# Patient Record
Sex: MALE | Race: WHITE | Employment: UNEMPLOYED | URBAN - METROPOLITAN AREA
[De-identification: names, ages, dates, MRNs, and addresses within clinical notes are randomized per-mention and may not be internally consistent; named-entity substitution may affect disease eponyms.]

---

## 2020-01-20 ENCOUNTER — APPOINTMENT (OUTPATIENT)
Dept: GENERAL RADIOLOGY | Facility: CLINIC | Age: 75
End: 2020-01-20
Payer: MEDICARE

## 2020-01-20 ENCOUNTER — HOSPITAL ENCOUNTER (EMERGENCY)
Facility: CLINIC | Age: 75
Discharge: HOME | End: 2020-01-20
Attending: SPECIALIST
Payer: MEDICARE

## 2020-01-20 VITALS
DIASTOLIC BLOOD PRESSURE: 85 MMHG | OXYGEN SATURATION: 100 % | BODY MASS INDEX: 22.4 KG/M2 | RESPIRATION RATE: 20 BRPM | HEART RATE: 67 BPM | HEIGHT: 71 IN | WEIGHT: 160 LBS | SYSTOLIC BLOOD PRESSURE: 187 MMHG | TEMPERATURE: 97.8 F

## 2020-01-20 PROCEDURE — 99284 EMERGENCY DEPT VISIT MOD MDM: CPT

## 2020-01-20 PROCEDURE — 73502 X-RAY EXAM HIP UNI 2-3 VIEWS: CPT

## 2020-01-20 RX ORDER — HYDROCODONE BITARTRATE AND ACETAMINOPHEN 5; 325 MG/1; MG/1
1 TABLET ORAL EVERY 6 HOURS PRN
COMMUNITY

## 2020-01-20 ASSESSMENT — PAIN DESCRIPTION - FREQUENCY: FREQUENCY: CONTINUOUS

## 2020-01-20 ASSESSMENT — ENCOUNTER SYMPTOMS
COUGH: 0
WHEEZING: 0
ABDOMINAL PAIN: 0
SHORTNESS OF BREATH: 0
COLOR CHANGE: 0
NAUSEA: 0

## 2020-01-20 ASSESSMENT — PAIN DESCRIPTION - DESCRIPTORS: DESCRIPTORS: THROBBING;TINGLING

## 2020-01-20 ASSESSMENT — PAIN DESCRIPTION - LOCATION: LOCATION: GROIN;HIP

## 2020-01-20 ASSESSMENT — PAIN SCALES - GENERAL: PAINLEVEL_OUTOF10: 8

## 2020-01-20 ASSESSMENT — PAIN DESCRIPTION - ORIENTATION: ORIENTATION: LEFT

## 2020-01-20 ASSESSMENT — PAIN DESCRIPTION - PAIN TYPE: TYPE: ACUTE PAIN

## 2020-01-20 NOTE — ED PROVIDER NOTES
[facility]  [facility]  Phone: [phone]      Pt Name: Gil Motley  MRN: [id number]  [place] 1945  Date of evaluation: 1/20/2020      CHIEF COMPLAINT       Chief Complaint   Patient presents with    Hip Pain     since Friday evening after \"turning wrong carrying firewood\", left hip, groin area and into left thigh. gait steady. HISTORY OF PRESENT ILLNESS    Gil Motley is a 66 y.o. male who presents   Chief Complaint   Patient presents with    Hip Pain     since Friday evening after \"turning wrong carrying firewood\", left hip, groin area and into left thigh. gait steady. .    49-year-old male patient presents to the emergency department accompanied by his family for evaluation of left hip pain radiating to the left groin with some tingling and numbness on anterior aspect of the left mid thigh since last 3 days. Patient states he was turning wrong carrying the firewood 3 days ago at the onset of the pain. Pain was 9 out of 10 in intensity initially and has decreased down to 3 out of 10 in intensity since patient has been taking ibuprofen and Norco.  He takes Norco for right elbow and left shoulder pain. He denies any bladder or bowel incontinence and denies any urinary frequency, urgency, dysuria or hematuria. No history of fever or chills. Patient also denies any abdominal pain, lightheadedness or dizziness and denies any testicular pain or swelling. Pain is worse with the movements, weightbearing and ambulation and better with rest as well as ibuprofen and Norco.    REVIEW OF SYSTEMS       Review of Systems   Constitutional: Negative for chills, diaphoresis and fever. Respiratory: Negative for cough, shortness of breath and wheezing. Cardiovascular: Negative for chest pain, palpitations and leg swelling. Gastrointestinal: Negative for abdominal pain and nausea. Musculoskeletal: Positive for arthralgias, gait problem and myalgias. Neurological:      Mental Status: He is alert and oriented to person, place, and time. DIFFERENTIAL DIAGNOSIS/ MDM:     Left groin strain, sciatica, superficial femoral nerve entrapment, nondisplaced fracture although doubtful    DIAGNOSTIC RESULTS     EKG: All EKG's are interpreted by the Emergency Department Physician who either signs or Co-signs this chart in the absence of a cardiologist.    None obtained    RADIOLOGY:   I directly visualized the following  images and reviewed the radiologist interpretations:  XR HIP 2-3 VW W PELVIS LEFT   Final Result   1. Mild bilateral hip osteoarthrosis. 2. No acute fracture or dislocation. Xr Hip 2-3 Vw W Pelvis Left    Result Date: 1/20/2020  EXAMINATION: ONE XRAY VIEW OF THE PELVIS AND TWO XRAY VIEWS LEFT HIP 1/20/2020 10:30 am COMPARISON: None. HISTORY: ORDERING SYSTEM PROVIDED HISTORY: Hip and groin pain TECHNOLOGIST PROVIDED HISTORY: Hip and groin pain Reason for Exam: Pt c/o LEFT hip and pelvic pain x3 days after turning wrong carrying firewood. Acuity: Acute Type of Exam: Initial 22-year-old male with left hip and groin pain FINDINGS: Mild osteophyte spurring and joint space narrowing involving the bilateral hip joint spaces. Diffuse osteopenia. Both femoral heads project over the bilateral acetabula without clear evidence for acute fracture, dislocation or femoral head flattening. Moderate stool burden. Mild degenerative changes in the lower lumbar spine. Pelvic phleboliths. 1. Mild bilateral hip osteoarthrosis. 2. No acute fracture or dislocation.        LABS:  Labs Reviewed - No data to display      EMERGENCY DEPARTMENT COURSE:   Vitals:    Vitals:    01/20/20 1016   BP: (!) 187/85   Pulse: 67   Resp: 20   Temp: 97.8 °F (36.6 °C)   TempSrc: Oral   SpO2: 100%   Weight: 72.6 kg (160 lb)   Height: 5' 11\" (1.803 m)     -------------------------  BP: (!) 187/85, Temp: 97.8 °F (36.6 °C), Pulse: 67, Resp: 20    No orders of the defined types were placed in this encounter. During the emergency department course, patient is resting comfortably and does not appear to be in any pain or distress. He recently filled a prescription for [place] prescribed from [facility].  He is advised to continue Norco and ibuprofen as needed, plenty of oral fluids, follow-up with PCP, return if worse    I have reviewed the disposition diagnosis with the patient and or their family/guardian. I have answered their questions and given discharge instructions. They voiced understanding of these instructions and did not have any further questions or complaints. Re-evaluation Notes    Patient is ambulatory in the hallway without any discomfort. He remained hemodynamically stable and neurologically intact throughout the ED course. PROCEDURES:  None    FINAL IMPRESSION      1. Strain of left hip, initial encounter    2. Groin strain, left, initial encounter          DISPOSITION/PLAN   DISPOSITION Decision To Discharge 01/20/2020 11:13:01 AM      Condition on Disposition    Stable    PATIENT REFERRED TO:  Your Physician at [facility]    Call in 1 day  For reevaluation of current symptoms    [facility] ED  C/ [address]   [phone]    If symptoms worsen      DISCHARGE MEDICATIONS:  Discharge Medication List as of 1/20/2020 11:14 AM          (Please note that portions of this note were completed with a voice recognition program.  Efforts were made to edit the dictations but occasionally words are mis-transcribed.)    Headley MD, F.A.C.E.P.   Attending Emergency Physician      Zoe Diaz MD  01/20/20 3679

## 2025-01-15 RX ORDER — SODIUM CHLORIDE, SODIUM LACTATE, POTASSIUM CHLORIDE, CALCIUM CHLORIDE 600; 310; 30; 20 MG/100ML; MG/100ML; MG/100ML; MG/100ML
INJECTION, SOLUTION INTRAVENOUS CONTINUOUS
OUTPATIENT
Start: 2025-01-15

## 2025-01-15 NOTE — DISCHARGE INSTRUCTIONS
Pre-operative Instructions           NOTHING to eat or drink after midnight the night prior to surgery   (This includes gum, candy, mints, chewing tobacco, etc). Smoking cessation is always advised.     Please arrive at the surgery center (Entrance B) by 9:15 AM on 2/5/2025 (or as directed by your surgeon's office).  See Directons to Surgery Center on next page.     Please take only the following medication(s) the day of surgery with a small sip of water: if needed/available: Norco    If applicable:   -Use/bring daily inhalers with you   -Do not take diabetic medications on the day of surgery.    Please stop any blood thinning medications  AS DIRECTED BY PRESCRIBING PROVIDER!     Failure to stop these medications as instructed (too soon or too late) may result in injury to you, or your surgery may need to be rescheduled.   Below is a list of some examples for your reference. This is not an all-inclusive list and if you have any questions/concerns, please check with your surgeon's office.     Antiplatelets : (stop blood cells (called platelets) from sticking together and forming a blood clot):   Aspirin (Bufferin, Ecotrin), Clopidogrel (Plavix), Ticagrelor (Brilinta), Prasugrel (Effient), Dipyridamole/aspirin (Aggrenox), Ticlopidine (Ticlid), Eptifibatide (Integrilin)    Anticoagulants: (slow down your body's process of making clots):   Warfarin (Coumadin), Rivaroxaban (Xarelto), Dabigatran (Pradaxa), Apixaban (Eliquis), Edoxaban (Savaysa), Heparin, Enoxaparin (Lovenox), Fondaparinux (Arixtra)    NSAIDS: Aspirin (Bufferin, Ecotrin), Ibuprofen (Motrin, Nuprin,Advil), Naproxen (Aleve),Meloxicam (Mobic), Celecoxib (Celebrex), Diclofenac (Voltaren), Etodolac (Lodine), Indomethacin, Ketorolac, Nabumetone, Oxaprozin (Daypro), Piroxicam (Feldene), Excedrin (has aspirin in it)    Herbals/supplements: Bromelain, Cinnamon, Cayenne Pepper, CoQ10, Dong quai (female ginseng), Fish oil, Garlic, Anita, Ginseng, Ginkgo biloba,

## 2025-01-21 ENCOUNTER — HOSPITAL ENCOUNTER (OUTPATIENT)
Dept: PREADMISSION TESTING | Age: 80
Discharge: HOME | End: 2025-01-25
Payer: MEDICARE

## 2025-01-21 VITALS
BODY MASS INDEX: 20.3 KG/M2 | TEMPERATURE: 97.9 F | DIASTOLIC BLOOD PRESSURE: 80 MMHG | WEIGHT: 145 LBS | HEART RATE: 66 BPM | SYSTOLIC BLOOD PRESSURE: 140 MMHG | HEIGHT: 71 IN | RESPIRATION RATE: 20 BRPM | OXYGEN SATURATION: 100 %

## 2025-01-21 LAB
ANION GAP SERPL CALCULATED.3IONS-SCNC: 10 MMOL/L (ref 9–16)
BUN SERPL-MCNC: 19 MG/DL (ref 8–23)
CALCIUM SERPL-MCNC: 9.4 MG/DL (ref 8.6–10.4)
CHLORIDE SERPL-SCNC: 104 MMOL/L (ref 98–107)
CO2 SERPL-SCNC: 26 MMOL/L (ref 20–31)
CREAT SERPL-MCNC: 0.8 MG/DL (ref 0.7–1.2)
ERYTHROCYTE [DISTWIDTH] IN BLOOD BY AUTOMATED COUNT: 12.8 % (ref 11.8–14.4)
GFR, ESTIMATED: 89 ML/MIN/1.73M2
GLUCOSE SERPL-MCNC: 103 MG/DL (ref 74–99)
HCT VFR BLD AUTO: 37.6 % (ref 40.7–50.3)
HGB BLD-MCNC: 12.6 G/DL (ref 13–17)
MCH RBC QN AUTO: 30.4 PG (ref 25.2–33.5)
MCHC RBC AUTO-ENTMCNC: 33.5 G/DL (ref 28.4–34.8)
MCV RBC AUTO: 90.6 FL (ref 82.6–102.9)
NRBC BLD-RTO: 0 PER 100 WBC
PLATELET # BLD AUTO: ABNORMAL K/UL (ref 138–453)
PLATELET, FLUORESCENCE: 159 K/UL (ref 138–453)
PLATELETS.RETICULATED NFR BLD AUTO: 2 % (ref 1.1–10.3)
POTASSIUM SERPL-SCNC: 4.1 MMOL/L (ref 3.7–5.3)
RBC # BLD AUTO: 4.15 M/UL (ref 4.21–5.77)
SODIUM SERPL-SCNC: 140 MMOL/L (ref 136–145)
WBC OTHER # BLD: 4.9 K/UL (ref 3.5–11.3)

## 2025-01-21 PROCEDURE — 80048 BASIC METABOLIC PNL TOTAL CA: CPT

## 2025-01-21 PROCEDURE — 36415 COLL VENOUS BLD VENIPUNCTURE: CPT

## 2025-01-21 PROCEDURE — 85027 COMPLETE CBC AUTOMATED: CPT

## 2025-01-21 PROCEDURE — 93005 ELECTROCARDIOGRAM TRACING: CPT | Performed by: ANESTHESIOLOGY

## 2025-01-21 PROCEDURE — 85055 RETICULATED PLATELET ASSAY: CPT

## 2025-01-21 RX ORDER — MULTIVITAMIN WITH IRON
1 TABLET ORAL DAILY
COMMUNITY

## 2025-01-21 RX ORDER — TADALAFIL 5 MG/1
5 TABLET ORAL DAILY
COMMUNITY

## 2025-01-21 NOTE — PROGRESS NOTES
Anesthesia Focused Assessment  Upcoming surgery:   2/5/2025 CYSTOSCOPY GREENLIGHT ([facility]-CONF#[id number]- [facility]) Augustine Nunez MD     History of anesthesia complications:  no  Family history of anesthesia complications:  no    METS functional capacity:   -Moderate/Excellent >4     + Covid-19 test in last 8 weeks? no    STOP-BANG Sleep Apnea Questionnaire  SNORE loudly (heard through closed doors)?   No  TIRED, fatigued, sleepy during daytime?    No  OBSERVED stopping breathing during sleep?   No  High blood PRESSURE or being treated?    No    BMI over 35?        No  AGE over 50?        Yes  NECK circumference over 16\"?     No  GENDER (male)?       Yes             Total 2  High risk 5-8  Intermediate risk 3-4  Low risk 0-2    ----------------------------------------------------------------------------------------------------------------------  TARA:                                             no  If yes, machine?:                              Type 1 DM:                                   no  T2DM:                                           no    Coronary Artery Disease:             no  Hypertension:                               as listed [facility], pt denies, states he has white coat syndrome- states was Rx'd meds per PCP but never started- states checks BPs at home and they are normal range, states his BP this a.m at home was 118/60. No cardiopulmonary complaints.   Defib / AICD / Pacemaker:           no    Renal Failure:                               (CKD listed in [facility] progress note, but pt denies)  If yes, on dialysis?:                           Active smoker:                              no  Drinks Alcohol:                              no  Illicit drugs:                                    no    Dentition:                                      dentures, full upper, partial lower    Past Medical History:   Diagnosis Date    Arthritis     back    Arthritis     shoulder    BPH (benign

## 2025-01-22 LAB
EKG ATRIAL RATE: 63 BPM
EKG P AXIS: 26 DEGREES
EKG P-R INTERVAL: 138 MS
EKG Q-T INTERVAL: 424 MS
EKG QRS DURATION: 96 MS
EKG QTC CALCULATION (BAZETT): 433 MS
EKG R AXIS: 70 DEGREES
EKG T AXIS: 77 DEGREES
EKG VENTRICULAR RATE: 63 BPM

## 2025-01-22 PROCEDURE — 93010 ELECTROCARDIOGRAM REPORT: CPT | Performed by: INTERNAL MEDICINE

## 2025-02-04 NOTE — H&P
Pre-op History and Physical      Patient:  Jairo Aly  MRN: [id number]  YOB: 1945    HISTORY OF PRESENT ILLNESS:     The patient is a 79 y.o. male who presents BPH with LUTS. Here for cystoscopy, greenlight PVP.    Patient's old records, notes and chart reviewed and summarized above.     I independently reviewed the images and verified the radiology reports from:    No results found.      Past Medical History:    Past Medical History:   Diagnosis Date    Arthritis     back    Arthritis     shoulder    BPH (benign prostatic hyperplasia)     Chronic lumbar pain     Hearing loss     History of hypertension     as listed in [facility] diagnosis list, pt denies, states he has white coat syndrome- states was Rx'd meds per PCP but never started- states checks BPs at home and they are normal range, states his BP this a.m at home was 118/60    Lumbar degenerative disc disease     herniated disc    Wears dentures     full upper, partial lower    Wellness examination     Dr. REMY Ren, PCP       Past Surgical History:    Past Surgical History:   Procedure Laterality Date    ARM SURGERY Right     elbow and lower arm    COLONOSCOPY      about 20 years ago    EYE SURGERY Bilateral     cataract removal w/IOL    TONSILLECTOMY         Medications Prior to Admission:    Prior to Admission medications    Medication Sig Start Date End Date Taking? Authorizing Provider   tadalafil (CIALIS) 5 MG tablet Take 1 tablet by mouth daily    ProviderRubina MD   Multiple Vitamin (MULTIVITAMIN) TABS tablet Take 1 tablet by mouth daily    Rubina Winslow MD   HYDROcodone-acetaminophen (NORCO) 5-325 MG per tablet Take 1 tablet by mouth every 6 hours as needed for Pain.    ProviderRubina MD       Allergies:  Patient has no known allergies.    Social History:    Social History     Socioeconomic History    Marital status:      Spouse name: Not on file    Number of children: Not on file    Years of

## 2025-02-04 NOTE — DISCHARGE INSTRUCTIONS
No alcoholic beverages, no driving or operating machinery, no making important decisions for 24 hours.   You may have a normal diet but should eat lightly day of surgery.  Drink plenty of fluids.  Urinate within 8 hours after surgery, if unable to urinate call your doctorDischarge instructions: Greenlight Photovaporization of the prostate:   The patient should have CBI weaned off in recovery.  Please call if urine is not clear / pink with CBI.  Traction on the catheter should be released before discharge  Contue flomax for 1 month (if taking)    You may see blood in the urine after the procedure.  This should resolve over the next couple days.  Please stay hydrated.  You may experience frequency/urgency of urination after the procedure.  We expect these symptoms to improve over the next couple weeks.      Tylenol for pain control  Patient ok to discharge home in good condition  No heavy lifting, >10 lbs for today  Patient should avoid strenuous activity for today  Patient should walk moderately at home  Patient ok to shower   Patient may resume diet as tolerated  Patient should take Rx as directed  No driving while on narcotics  Please call attending physician or hospital  with questions  Call or Present to ED if fever (> 101F), intractable nausea vomiting or pain.    Pt should follow up with Dr. Nunez's office tomorrow to have catheter removed, call to confirm appointment    Home with ardon catheter.  Please teach ardon education and send home with leg and night bag.  You may see intermittent blood in the urine while the catheter in place.  If the catheter becomes obstructed and needs to be exchanged, please call.

## 2025-02-05 ENCOUNTER — ANESTHESIA EVENT (OUTPATIENT)
Dept: OPERATING ROOM | Age: 80
End: 2025-02-05
Payer: MEDICARE

## 2025-02-05 ENCOUNTER — HOSPITAL ENCOUNTER (OUTPATIENT)
Age: 80
Setting detail: OUTPATIENT SURGERY
Discharge: HOME | End: 2025-02-05
Attending: UROLOGY | Admitting: UROLOGY
Payer: MEDICARE

## 2025-02-05 ENCOUNTER — ANESTHESIA (OUTPATIENT)
Dept: OPERATING ROOM | Age: 80
End: 2025-02-05
Payer: MEDICARE

## 2025-02-05 VITALS
BODY MASS INDEX: 20.22 KG/M2 | TEMPERATURE: 97.2 F | HEART RATE: 58 BPM | OXYGEN SATURATION: 100 % | WEIGHT: 145 LBS | SYSTOLIC BLOOD PRESSURE: 172 MMHG | RESPIRATION RATE: 15 BRPM | DIASTOLIC BLOOD PRESSURE: 72 MMHG

## 2025-02-05 PROCEDURE — 2580000003 HC RX 258: Performed by: NURSE ANESTHETIST, CERTIFIED REGISTERED

## 2025-02-05 PROCEDURE — 3700000000 HC ANESTHESIA ATTENDED CARE: Performed by: UROLOGY

## 2025-02-05 PROCEDURE — 6370000000 HC RX 637 (ALT 250 FOR IP): Performed by: ANESTHESIOLOGY

## 2025-02-05 PROCEDURE — 3600000014 HC SURGERY LEVEL 4 ADDTL 15MIN: Performed by: UROLOGY

## 2025-02-05 PROCEDURE — 6360000002 HC RX W HCPCS: Performed by: NURSE ANESTHETIST, CERTIFIED REGISTERED

## 2025-02-05 PROCEDURE — 2500000003 HC RX 250 WO HCPCS: Performed by: UROLOGY

## 2025-02-05 PROCEDURE — 3700000001 HC ADD 15 MINUTES (ANESTHESIA): Performed by: UROLOGY

## 2025-02-05 PROCEDURE — 7100000001 HC PACU RECOVERY - ADDTL 15 MIN: Performed by: UROLOGY

## 2025-02-05 PROCEDURE — 7100000010 HC PHASE II RECOVERY - FIRST 15 MIN: Performed by: UROLOGY

## 2025-02-05 PROCEDURE — 3600000004 HC SURGERY LEVEL 4 BASE: Performed by: UROLOGY

## 2025-02-05 PROCEDURE — 7100000011 HC PHASE II RECOVERY - ADDTL 15 MIN: Performed by: UROLOGY

## 2025-02-05 PROCEDURE — 2720000010 HC SURG SUPPLY STERILE: Performed by: UROLOGY

## 2025-02-05 PROCEDURE — 2709999900 HC NON-CHARGEABLE SUPPLY: Performed by: UROLOGY

## 2025-02-05 PROCEDURE — 2500000003 HC RX 250 WO HCPCS: Performed by: NURSE ANESTHETIST, CERTIFIED REGISTERED

## 2025-02-05 PROCEDURE — 6360000002 HC RX W HCPCS: Performed by: PHYSICIAN ASSISTANT

## 2025-02-05 PROCEDURE — 7100000000 HC PACU RECOVERY - FIRST 15 MIN: Performed by: UROLOGY

## 2025-02-05 RX ORDER — METOCLOPRAMIDE HYDROCHLORIDE 5 MG/ML
10 INJECTION INTRAMUSCULAR; INTRAVENOUS
Status: DISCONTINUED | OUTPATIENT
Start: 2025-02-05 | End: 2025-02-05 | Stop reason: HOSPADM

## 2025-02-05 RX ORDER — SODIUM CHLORIDE 9 MG/ML
INJECTION, SOLUTION INTRAVENOUS PRN
Status: DISCONTINUED | OUTPATIENT
Start: 2025-02-05 | End: 2025-02-05 | Stop reason: HOSPADM

## 2025-02-05 RX ORDER — SODIUM CHLORIDE, SODIUM LACTATE, POTASSIUM CHLORIDE, CALCIUM CHLORIDE 600; 310; 30; 20 MG/100ML; MG/100ML; MG/100ML; MG/100ML
INJECTION, SOLUTION INTRAVENOUS CONTINUOUS
Status: DISCONTINUED | OUTPATIENT
Start: 2025-02-05 | End: 2025-02-05 | Stop reason: HOSPADM

## 2025-02-05 RX ORDER — HYDRALAZINE HYDROCHLORIDE 20 MG/ML
10 INJECTION INTRAMUSCULAR; INTRAVENOUS
Status: DISCONTINUED | OUTPATIENT
Start: 2025-02-05 | End: 2025-02-05 | Stop reason: HOSPADM

## 2025-02-05 RX ORDER — NALOXONE HYDROCHLORIDE 0.4 MG/ML
INJECTION, SOLUTION INTRAMUSCULAR; INTRAVENOUS; SUBCUTANEOUS PRN
Status: DISCONTINUED | OUTPATIENT
Start: 2025-02-05 | End: 2025-02-05 | Stop reason: HOSPADM

## 2025-02-05 RX ORDER — FENTANYL CITRATE 50 UG/ML
INJECTION, SOLUTION INTRAMUSCULAR; INTRAVENOUS
Status: DISCONTINUED | OUTPATIENT
Start: 2025-02-05 | End: 2025-02-05 | Stop reason: SDUPTHER

## 2025-02-05 RX ORDER — SODIUM CHLORIDE, SODIUM LACTATE, POTASSIUM CHLORIDE, CALCIUM CHLORIDE 600; 310; 30; 20 MG/100ML; MG/100ML; MG/100ML; MG/100ML
INJECTION, SOLUTION INTRAVENOUS
Status: DISCONTINUED | OUTPATIENT
Start: 2025-02-05 | End: 2025-02-05 | Stop reason: SDUPTHER

## 2025-02-05 RX ORDER — EPHEDRINE SULFATE/0.9% NACL/PF 25 MG/5 ML
SYRINGE (ML) INTRAVENOUS
Status: DISCONTINUED | OUTPATIENT
Start: 2025-02-05 | End: 2025-02-05 | Stop reason: SDUPTHER

## 2025-02-05 RX ORDER — DIPHENHYDRAMINE HYDROCHLORIDE 50 MG/ML
12.5 INJECTION INTRAMUSCULAR; INTRAVENOUS
Status: DISCONTINUED | OUTPATIENT
Start: 2025-02-05 | End: 2025-02-05 | Stop reason: HOSPADM

## 2025-02-05 RX ORDER — MEPERIDINE HYDROCHLORIDE 50 MG/ML
12.5 INJECTION INTRAMUSCULAR; INTRAVENOUS; SUBCUTANEOUS EVERY 5 MIN PRN
Status: DISCONTINUED | OUTPATIENT
Start: 2025-02-05 | End: 2025-02-05 | Stop reason: HOSPADM

## 2025-02-05 RX ORDER — DEXAMETHASONE SODIUM PHOSPHATE 10 MG/ML
INJECTION INTRAMUSCULAR; INTRAVENOUS
Status: DISCONTINUED | OUTPATIENT
Start: 2025-02-05 | End: 2025-02-05 | Stop reason: SDUPTHER

## 2025-02-05 RX ORDER — ONDANSETRON 2 MG/ML
INJECTION INTRAMUSCULAR; INTRAVENOUS
Status: DISCONTINUED | OUTPATIENT
Start: 2025-02-05 | End: 2025-02-05 | Stop reason: SDUPTHER

## 2025-02-05 RX ORDER — PHENAZOPYRIDINE HYDROCHLORIDE 200 MG/1
200 TABLET, FILM COATED ORAL 3 TIMES DAILY PRN
Qty: 9 TABLET | Refills: 0 | Status: SHIPPED | OUTPATIENT
Start: 2025-02-05

## 2025-02-05 RX ORDER — DROPERIDOL 2.5 MG/ML
0.62 INJECTION, SOLUTION INTRAMUSCULAR; INTRAVENOUS
Status: DISCONTINUED | OUTPATIENT
Start: 2025-02-05 | End: 2025-02-05 | Stop reason: HOSPADM

## 2025-02-05 RX ORDER — HYDROCODONE BITARTRATE AND ACETAMINOPHEN 5; 325 MG/1; MG/1
1 TABLET ORAL ONCE
Status: COMPLETED | OUTPATIENT
Start: 2025-02-05 | End: 2025-02-05

## 2025-02-05 RX ORDER — PROPOFOL 10 MG/ML
INJECTION, EMULSION INTRAVENOUS
Status: DISCONTINUED | OUTPATIENT
Start: 2025-02-05 | End: 2025-02-05 | Stop reason: SDUPTHER

## 2025-02-05 RX ORDER — SODIUM CHLORIDE 0.9 % (FLUSH) 0.9 %
5-40 SYRINGE (ML) INJECTION EVERY 12 HOURS SCHEDULED
Status: DISCONTINUED | OUTPATIENT
Start: 2025-02-05 | End: 2025-02-05 | Stop reason: HOSPADM

## 2025-02-05 RX ORDER — LIDOCAINE HYDROCHLORIDE 10 MG/ML
INJECTION, SOLUTION EPIDURAL; INFILTRATION; INTRACAUDAL; PERINEURAL
Status: DISCONTINUED | OUTPATIENT
Start: 2025-02-05 | End: 2025-02-05 | Stop reason: SDUPTHER

## 2025-02-05 RX ORDER — MAGNESIUM HYDROXIDE 1200 MG/15ML
LIQUID ORAL CONTINUOUS PRN
Status: DISCONTINUED | OUTPATIENT
Start: 2025-02-05 | End: 2025-02-05 | Stop reason: HOSPADM

## 2025-02-05 RX ORDER — SODIUM CHLORIDE 0.9 % (FLUSH) 0.9 %
5-40 SYRINGE (ML) INJECTION PRN
Status: DISCONTINUED | OUTPATIENT
Start: 2025-02-05 | End: 2025-02-05 | Stop reason: HOSPADM

## 2025-02-05 RX ADMIN — FENTANYL CITRATE 25 MCG: 50 INJECTION, SOLUTION INTRAMUSCULAR; INTRAVENOUS at 12:21

## 2025-02-05 RX ADMIN — PROPOFOL 100 MG: 10 INJECTION, EMULSION INTRAVENOUS at 11:50

## 2025-02-05 RX ADMIN — EPHEDRINE SULFATE 5 MG: 5 INJECTION INTRAVENOUS at 11:58

## 2025-02-05 RX ADMIN — PROPOFOL 50 MG: 10 INJECTION, EMULSION INTRAVENOUS at 11:51

## 2025-02-05 RX ADMIN — EPHEDRINE SULFATE 10 MG: 5 INJECTION INTRAVENOUS at 12:05

## 2025-02-05 RX ADMIN — DEXAMETHASONE SODIUM PHOSPHATE 4 MG: 10 INJECTION INTRAMUSCULAR; INTRAVENOUS at 12:00

## 2025-02-05 RX ADMIN — Medication 2000 MG: at 11:57

## 2025-02-05 RX ADMIN — FENTANYL CITRATE 25 MCG: 50 INJECTION, SOLUTION INTRAMUSCULAR; INTRAVENOUS at 11:59

## 2025-02-05 RX ADMIN — HYDROCODONE BITARTRATE AND ACETAMINOPHEN 1 TABLET: 5; 325 TABLET ORAL at 14:28

## 2025-02-05 RX ADMIN — PROPOFOL 50 MG: 10 INJECTION, EMULSION INTRAVENOUS at 11:52

## 2025-02-05 RX ADMIN — ONDANSETRON 4 MG: 2 INJECTION INTRAMUSCULAR; INTRAVENOUS at 12:28

## 2025-02-05 RX ADMIN — LIDOCAINE HYDROCHLORIDE 50 MG: 10 INJECTION, SOLUTION EPIDURAL; INFILTRATION; INTRACAUDAL; PERINEURAL at 11:50

## 2025-02-05 RX ADMIN — SODIUM CHLORIDE, POTASSIUM CHLORIDE, SODIUM LACTATE AND CALCIUM CHLORIDE: 600; 310; 30; 20 INJECTION, SOLUTION INTRAVENOUS at 11:30

## 2025-02-05 ASSESSMENT — PAIN DESCRIPTION - LOCATION: LOCATION: PENIS

## 2025-02-05 ASSESSMENT — PAIN SCALES - GENERAL
PAINLEVEL_OUTOF10: 4
PAINLEVEL_OUTOF10: 2
PAINLEVEL_OUTOF10: 6
PAINLEVEL_OUTOF10: 5

## 2025-02-05 ASSESSMENT — PAIN - FUNCTIONAL ASSESSMENT: PAIN_FUNCTIONAL_ASSESSMENT: 0-10

## 2025-02-05 ASSESSMENT — PAIN DESCRIPTION - DESCRIPTORS: DESCRIPTORS: ACHING

## 2025-02-05 ASSESSMENT — PAIN DESCRIPTION - PAIN TYPE: TYPE: SURGICAL PAIN

## 2025-02-05 NOTE — ANESTHESIA POSTPROCEDURE EVALUATION
Department of Anesthesiology  Postprocedure Note    Patient: Jairo Aly  MRN: [id number]  YOB: 1945  Date of evaluation: 2/5/2025    Procedure Summary       Date: 02/05/25 Room / Location: [address]    Anesthesia Start: 1147 Anesthesia Stop: 1247    Procedure: CYSTOSCOPY GREENLIGHT ([facility] CONF#[id number]- [facility]) Diagnosis:       BPH with obstruction/lower urinary tract symptoms      (BPH with obstruction/lower urinary tract symptoms [N40.1, N13.8])    Surgeons: Augustine Nunez MD Responsible Provider: Saul Markham MD    Anesthesia Type: general ASA Status: 2            Anesthesia Type: No value filed.    Joce Phase I: Joce Score: 10    Joce Phase II: Joce Score: 10    Anesthesia Post Evaluation    Patient location during evaluation: bedside  Patient participation: complete - patient participated  Level of consciousness: awake and alert  Airway patency: patent  Nausea & Vomiting: no nausea and no vomiting  Cardiovascular status: hemodynamically stable  Respiratory status: acceptable  Hydration status: stable  Comments: BP (!) 172/72   Pulse 58   Temp 97.2 °F (36.2 °C) (Temporal)   Resp 15   Wt 65.8 kg (145 lb)   SpO2 100%   BMI 20.22 kg/m²     Pain management: adequate    No notable events documented.

## 2025-02-05 NOTE — OP NOTE
Operative Note      Patient: Jairo Aly  YOB: 1945  MRN: [id number]    Date of Procedure: 2/5/2025    Pre-Op Diagnosis Codes:      * BPH with obstruction/lower urinary tract symptoms [N40.1, N13.8]    Post-Op Diagnosis: Same       Procedure(s):  CYSTOSCOPY GREENLIGHT ([facility] CONF#[id number]- [facility])    Surgeon(s):  Augustine Nunez MD    Assistant:   Resident: Mil Flores MD; Jamil Pike MD    Anesthesia: General    Estimated Blood Loss (mL): Minimal    Complications: None    Specimens:   * No specimens in log *    Implants:  * No implants in log *      Drains:   Urinary Catheter 02/05/25 3 Way (Active)   Catheter Indications Perioperative use for selected surgical procedures 02/05/25 1500   Site Assessment No urethral drainage 02/05/25 1500   Urine Color Pink 02/05/25 1500   Urine Appearance Clear 02/05/25 1500   Collection Container Standard 02/05/25 1500   Securement Method Leg strap 02/05/25 1500   Catheter Best Practices  Drainage tube clipped to bed;Catheter secured to thigh;Bag below bladder;Bag not on floor;Lack of dependent loop in tubing 02/05/25 1500   Status Draining;Patent 02/05/25 1500   Rate Other (Comment) 02/05/25 1445   Irrigant Normal saline 02/05/25 1405   CBI Irrigation Intake (mL) 600 mL 02/05/25 1430   CBI Gomez Output (mL) 650 mL 02/05/25 1430   CBI Net Output (mL) 50 mL 02/05/25 1430   Output (mL) 900 mL 02/05/25 1545   Discontinuation Reason Per provider order 02/05/25 1445       Findings:  Infection Present At Time Of Surgery (PATOS) (choose all levels that have infection present):  No infection present  Other Findings: Bilobar prostatic hypertrophy     INDICATIONS:  Patient is a 79 year old male with BPH with obstruction. He presents today for GreenLight photovaporization of the prostate. After risks, benefits and alternatives of the procedure were discussed with the patient, informed consent was obtained and the patient elected to proceed.     OPERATIVE SUMMARY:  The

## 2025-02-05 NOTE — ANESTHESIA PRE PROCEDURE
Department of Anesthesiology  Preprocedure Note       Name:  Jairo Aly   Age:  79 y.o.  :  1945                                          MRN:  [id number]         Date:  2025      Surgeon: Surgeon(s):  Augustine Nunez MD    Procedure: Procedure(s):  CYSTOSCOPY GREENLIGHT ([facility] CONF#[id number]- [facility])    Medications prior to admission:   Prior to Admission medications    Medication Sig Start Date End Date Taking? Authorizing Provider   tadalafil (CIALIS) 5 MG tablet Take 1 tablet by mouth daily    Rubian Winslow MD   Multiple Vitamin (MULTIVITAMIN) TABS tablet Take 1 tablet by mouth daily    Rubina Winslow MD   HYDROcodone-acetaminophen (NORCO) 5-325 MG per tablet Take 1 tablet by mouth every 6 hours as needed for Pain.    ProviderRubina MD       Current medications:    Current Facility-Administered Medications   Medication Dose Route Frequency Provider Last Rate Last Admin   • ceFAZolin (ANCEF) 2000 mg in sterile water 20 mL IV syringe  2,000 mg IntraVENous On Call to OR Nela Adkins PA-C       • lactated ringers infusion   IntraVENous Continuous Munir Mcrae MD           Allergies:  No Known Allergies    Problem List:  There is no problem list on file for this patient.      Past Medical History:        Diagnosis Date   • Arthritis     back   • Arthritis     shoulder   • BPH (benign prostatic hyperplasia)    • Chronic lumbar pain    • Hearing loss    • History of hypertension     as listed in [facility] diagnosis list, pt denies, states he has white coat syndrome- states was Rx'd meds per PCP but never started- states checks BPs at home and they are normal range, states his BP this a.m at home was 118/60   • Lumbar degenerative disc disease     herniated disc   • Wears dentures     full upper, partial lower   • Wellness examination     Dr. REMY Ren, PCP       Past Surgical History:        Procedure Laterality Date   • ARM SURGERY Right     elbow and lower arm   •

## (undated) DEVICE — CYSTO/BLADDER IRRIGATION SET, REGULATING CLAMP

## (undated) DEVICE — SEAL ENDO INSTR SELF SEAL UROLOGY

## (undated) DEVICE — Device

## (undated) DEVICE — SOLUTION IRRIG 3000ML 0.9% SOD CHL USP UROMATIC PLAS CONT

## (undated) DEVICE — STRAP ARMBRD W1.5XL32IN FOAM STR YET SFT W/ HK AND LOOP

## (undated) DEVICE — RENTAL LASER XPS CASE

## (undated) DEVICE — FIBER LASER MOXY 532NM WAVELENGTH LIQUID COOLED SINGLE-USE F/GREENLIGHT XPS SYSTEM

## (undated) DEVICE — DRAPE,REIN 53X77,STERILE: Brand: MEDLINE

## (undated) DEVICE — CATHETER URETH 22FR BLLN 30CC 3 W F SPEC INF CTRL BARDX

## (undated) DEVICE — STRAP,CATHETER,ELASTIC,HOOK&LOOP: Brand: MEDLINE

## (undated) DEVICE — SYRINGE MED 10ML LUERLOCK TIP W/O SFTY DISP

## (undated) DEVICE — GARMENT,MEDLINE,DVT,INT,CALF,MED, GEN2: Brand: MEDLINE

## (undated) DEVICE — GLOVE ORANGE PI 7 1/2   MSG9075

## (undated) DEVICE — SYRINGE MED 20ML STD CLR PLAS LUERLOCK TIP N CTRL DISP

## (undated) DEVICE — GOWN,SIRUS,NONRNF,SETINSLV,XL,20/CS: Brand: MEDLINE